# Patient Record
(demographics unavailable — no encounter records)

---

## 2025-04-24 NOTE — ASSESSMENT
[FreeTextEntry1] : wbat ice/elevate post op shoe jessy tape demonstrated limit activity  f/up 1 month if not resolved

## 2025-04-24 NOTE — PHYSICAL EXAM
[Left] : left foot and ankle [Mild] : mild swelling of toe(s) [5th] : 5th [NL (40)] : plantar flexion 40 degrees [NL 30)] : inversion 30 degrees [NL (20)] : eversion 20 degrees [5___] : eversion 5[unfilled]/5 [2+] : dorsalis pedis pulse: 2+ [] : no achilles tendon tenderness

## 2025-04-24 NOTE — HISTORY OF PRESENT ILLNESS
[de-identified] : 04/24/2025:  struck 5th toe twice in last few days w/ pain. no prior foot probs. no tx to date. 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

## 2025-07-08 NOTE — ASSESSMENT
[FreeTextEntry1] : NF DOI 6/27/25 43F with Cervical myofascial pain/ strain, C-DDD, radiculitis Hx C-HNP  - nature of diagnosis discussed - treatment options reviewed - Lidocaine patches sent, she cannot take muscle relaxers with caring for small children at home -MDP sent, no nsaids while on dose cary - MRI C spine - PT - medical massage and acupuncture - return after MRI for review/ 3-4 weeks

## 2025-07-08 NOTE — PHYSICAL EXAM
[Flexion] : flexion [Extension] : extension [Rotation to left] : rotation to left [Rotation to right] : rotation to right [] : negative Merida reflex [FreeTextEntry3] : lower cervical postural kyphosis [FreeTextEntry9] : Guarding

## 2025-07-08 NOTE — IMAGING
[Straightening consistent with spasm] : Straightening consistent with spasm [Facet arthropathy] : Facet arthropathy [Disc space narrowing] : Disc space narrowing [FreeTextEntry1] : C4/5, C5/6
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
